# Patient Record
Sex: MALE | Race: WHITE | NOT HISPANIC OR LATINO | Employment: FULL TIME | URBAN - METROPOLITAN AREA
[De-identification: names, ages, dates, MRNs, and addresses within clinical notes are randomized per-mention and may not be internally consistent; named-entity substitution may affect disease eponyms.]

---

## 2022-05-24 PROBLEM — Z98.818 OTHER DENTAL PROCEDURE STATUS: Status: ACTIVE | Noted: 2018-07-09

## 2022-05-24 PROBLEM — K05.00 ACUTE GINGIVITIS, PLAQUE INDUCED: Status: ACTIVE | Noted: 2017-10-17

## 2022-05-24 PROBLEM — K02.52 DENTAL CARIES ON PIT AND FISSURE SURFACE PENETRATING INTO DENTIN: Status: ACTIVE | Noted: 2017-10-17

## 2022-05-24 PROBLEM — K04.7 PERIAPICAL ABSCESS WITHOUT SINUS: Status: ACTIVE | Noted: 2017-10-16

## 2022-05-24 PROBLEM — K02.53 DENTAL CARIES ON PIT AND FISSURE SURFACE PENETRATING INTO PULP: Status: ACTIVE | Noted: 2017-10-16

## 2022-05-24 PROBLEM — F33.2 MAJOR DEPRESSIVE DISORDER, RECURRENT SEVERE WITHOUT PSYCHOTIC FEATURES: Status: ACTIVE | Noted: 2022-05-24

## 2022-06-14 PROBLEM — S81.811A: Status: ACTIVE | Noted: 2022-06-14

## 2023-10-17 ENCOUNTER — ON-DEMAND VIRTUAL (OUTPATIENT)
Dept: URGENT CARE | Facility: CLINIC | Age: 43
End: 2023-10-17
Payer: MEDICAID

## 2023-10-17 DIAGNOSIS — R21 RASH AND NONSPECIFIC SKIN ERUPTION: Primary | ICD-10-CM

## 2023-10-17 DIAGNOSIS — L23.7 ALLERGIC DERMATITIS DUE TO POISON OAK: ICD-10-CM

## 2023-10-17 PROCEDURE — 99202 OFFICE O/P NEW SF 15 MIN: CPT | Mod: 95,,, | Performed by: FAMILY MEDICINE

## 2023-10-17 PROCEDURE — 99202 PR OFFICE/OUTPT VISIT, NEW, LEVL II, 15-29 MIN: ICD-10-PCS | Mod: 95,,, | Performed by: FAMILY MEDICINE

## 2023-10-17 RX ORDER — HYDROXYZINE HYDROCHLORIDE 25 MG/1
25 TABLET, FILM COATED ORAL 3 TIMES DAILY
Qty: 30 TABLET | Refills: 0 | Status: SHIPPED | OUTPATIENT
Start: 2023-10-17 | End: 2023-10-27

## 2023-10-17 RX ORDER — METHYLPREDNISOLONE 4 MG/1
TABLET ORAL
Qty: 21 EACH | Refills: 0 | Status: SHIPPED | OUTPATIENT
Start: 2023-10-17 | End: 2023-11-07

## 2023-10-17 RX ORDER — TRIAMCINOLONE ACETONIDE 0.25 MG/G
CREAM TOPICAL 2 TIMES DAILY
Qty: 80 G | Refills: 1 | Status: SHIPPED | OUTPATIENT
Start: 2023-10-17

## 2023-10-18 NOTE — PROGRESS NOTES
Subjective:      Patient ID: Adelfo Ambrocio is a 43 y.o. male.    Vitals:  vitals were not taken for this visit.     Chief Complaint: Rash      Visit Type: TELE AUDIOVISUAL    Present with the patient at the time of consultation: TELEMED PRESENT WITH PATIENT: None      History reviewed. No pertinent past medical history.  Past Surgical History:   Procedure Laterality Date    left shoulder       RECONSTRUCTION OF MEDIAL PATELLOFEMORAL LIGAMENT OF RIGHT KNEE       Review of patient's allergies indicates:  No Known Allergies  Current Outpatient Medications on File Prior to Visit   Medication Sig Dispense Refill    amoxicillin (AMOXIL) 500 MG Tab Take 500 mg by mouth 3 (three) times daily.      amoxicillin (AMOXIL) 875 MG tablet Take 875 mg by mouth 2 (two) times daily.      amoxicillin-clavulanate 500-125mg (AUGMENTIN) 500-125 mg Tab Take 1 tablet by mouth every 8 (eight) hours.      buPROPion (WELLBUTRIN SR) 150 MG TBSR 12 hr tablet Take 150 mg by mouth 2 (two) times daily.      cephALEXin (KEFLEX) 500 MG capsule Take 1,000 mg by mouth 2 (two) times daily.      cyclobenzaprine (FLEXERIL) 10 MG tablet Take 10 mg by mouth 3 (three) times daily as needed.      HYDROcodone-acetaminophen (NORCO) 7.5-325 mg per tablet Take 1 tablet by mouth every 8 (eight) hours.      ibuprofen (ADVIL,MOTRIN) 800 MG tablet Take 800 mg by mouth 3 (three) times daily.      ketorolac (TORADOL) 10 mg tablet Take 10 mg by mouth 3 (three) times daily.      LIDOCAINE VISCOUS 2 % solution Take 10 mLs by mouth every 4 (four) hours as needed.      ondansetron (ZOFRAN-ODT) 4 MG TbDL Take 4 mg by mouth every 6 (six) hours as needed.      sulfamethoxazole-trimethoprim 800-160mg (BACTRIM DS) 800-160 mg Tab Take 2 tablets by mouth 2 (two) times daily.       No current facility-administered medications on file prior to visit.     History reviewed. No pertinent family history.        [facility] Intake    10/17/2023 10:50 PM CDT - Filed by Patient    Describe your reason for todays visit Poison oak   What is your current physical address in the event of a medical emergency?    Are you able to take your vital signs? No   Please attach any relevant images or files          Approximately 3  days ago  Was cleaning his yard, in an area of overgrowth.  In the process he noted some extensive itching, which at this point has worsened  Denies using anything oral to help  Admits to taking a salt bath, but that did not help either      Rash  The current episode started in the past 7 days. The affected locations include the right arm and left arm. The rash is characterized by redness, itchiness and swelling. He was exposed to plant contact. Pertinent negatives include no fever. Past treatments include nothing.       Constitution: Negative for fever.   Skin:  Positive for rash and hives.   Allergic/Immunologic: Positive for hives and itching.        Objective:   The physical exam was conducted virtually.  Physical Exam   Constitutional: He is oriented to person, place, and time.   HENT:   Head: Normocephalic and atraumatic.   Nose: Nose normal.   Pulmonary/Chest: Effort normal.   Abdominal: Normal appearance.   Neurological: no focal deficit. He is alert and oriented to person, place, and time.   Skin: Skin is dry.         Comments: Bilateral forearms    Erythematous, excoriations, edematous       Assessment:     1. Rash and nonspecific skin eruption    2. Allergic dermatitis due to poison oak        Plan:   Will symptomatically treat    Medrol dose pack    Hydroxyzine   Along with Triamcinolone    Advised to avoid salt baths    Use of oatmeal baths, and proper moisturization